# Patient Record
Sex: MALE | Race: OTHER | NOT HISPANIC OR LATINO | URBAN - METROPOLITAN AREA
[De-identification: names, ages, dates, MRNs, and addresses within clinical notes are randomized per-mention and may not be internally consistent; named-entity substitution may affect disease eponyms.]

---

## 2021-08-28 ENCOUNTER — EMERGENCY (EMERGENCY)
Facility: HOSPITAL | Age: 41
LOS: 1 days | Discharge: HOME | End: 2021-08-28
Attending: EMERGENCY MEDICINE
Payer: COMMERCIAL

## 2021-08-28 VITALS
RESPIRATION RATE: 16 BRPM | SYSTOLIC BLOOD PRESSURE: 153 MMHG | OXYGEN SATURATION: 97 % | DIASTOLIC BLOOD PRESSURE: 91 MMHG | TEMPERATURE: 99 F | WEIGHT: 212.08 LBS | HEART RATE: 78 BPM | HEIGHT: 68 IN

## 2021-08-28 VITALS
SYSTOLIC BLOOD PRESSURE: 144 MMHG | HEART RATE: 70 BPM | OXYGEN SATURATION: 100 % | RESPIRATION RATE: 17 BRPM | DIASTOLIC BLOOD PRESSURE: 87 MMHG | TEMPERATURE: 98 F

## 2021-08-28 PROCEDURE — G1004: CPT

## 2021-08-28 PROCEDURE — 99284 EMERGENCY DEPT VISIT MOD MDM: CPT

## 2021-08-28 PROCEDURE — 72192 CT PELVIS W/O DYE: CPT | Mod: 26,MG

## 2021-08-28 PROCEDURE — 73562 X-RAY EXAM OF KNEE 3: CPT

## 2021-08-28 PROCEDURE — 99284 EMERGENCY DEPT VISIT MOD MDM: CPT | Mod: 25

## 2021-08-28 PROCEDURE — 73562 X-RAY EXAM OF KNEE 3: CPT | Mod: 26,LT

## 2021-08-28 PROCEDURE — 72192 CT PELVIS W/O DYE: CPT | Mod: MG

## 2021-08-28 RX ORDER — IBUPROFEN 200 MG
600 TABLET ORAL ONCE
Refills: 0 | Status: COMPLETED | OUTPATIENT
Start: 2021-08-28 | End: 2021-08-28

## 2021-08-28 RX ORDER — FAMOTIDINE 10 MG/ML
1 INJECTION INTRAVENOUS
Qty: 7 | Refills: 0
Start: 2021-08-28 | End: 2021-09-03

## 2021-08-28 RX ADMIN — Medication 600 MILLIGRAM(S): at 16:18

## 2021-08-28 RX ADMIN — Medication 600 MILLIGRAM(S): at 17:18

## 2021-08-28 NOTE — ED ADULT NURSE NOTE - DRUG PRE-SCREENING (DAST -1)
65
Statement Selected
I will SWITCH the dose or number of times a day I take the medications listed below when I get home from the hospital:  None

## 2021-08-28 NOTE — ED PROVIDER NOTE - MUSCULOSKELETAL, MLM
Effusion tot he right knee. No marcy tenderness. No ecchymosis, no redness. Tenderness in the bilateral SI joints.

## 2021-08-28 NOTE — ED PROVIDER NOTE - PATIENT PORTAL LINK FT
You can access the FollowMyHealth Patient Portal offered by [facility] by registering at the following website: [email or web address]. By joining [facility]’s FollowMyHealth portal, you will also be able to view your health information using other applications (apps) compatible with our system.

## 2021-08-28 NOTE — ED PROVIDER NOTE - OBJECTIVE STATEMENT
40 year old male with PMHx of polio with left-sided hemiparesis and no significant PSHx presents to the ED with complaints of left knee pain and right hip pain. Patient states that he feels as though he leans too much onto his right hip when he walks. Patient denies any recent falls or any other acute complaints.   Allergies: Sulfa drugs (rash)

## 2021-10-20 ENCOUNTER — EMERGENCY (EMERGENCY)
Facility: HOSPITAL | Age: 41
LOS: 1 days | Discharge: HOME | End: 2021-10-20
Attending: EMERGENCY MEDICINE
Payer: COMMERCIAL

## 2021-10-20 VITALS
WEIGHT: 225.09 LBS | DIASTOLIC BLOOD PRESSURE: 82 MMHG | OXYGEN SATURATION: 98 % | HEIGHT: 68 IN | RESPIRATION RATE: 16 BRPM | HEART RATE: 67 BPM | TEMPERATURE: 98 F | SYSTOLIC BLOOD PRESSURE: 141 MMHG

## 2021-10-20 PROBLEM — A80.9 ACUTE POLIOMYELITIS, UNSPECIFIED: Chronic | Status: ACTIVE | Noted: 2021-08-28

## 2021-10-20 PROCEDURE — 99284 EMERGENCY DEPT VISIT MOD MDM: CPT | Mod: 25

## 2021-10-20 PROCEDURE — 73620 X-RAY EXAM OF FOOT: CPT | Mod: 26,RT

## 2021-10-20 PROCEDURE — 73610 X-RAY EXAM OF ANKLE: CPT | Mod: 26,RT

## 2021-10-20 PROCEDURE — 73620 X-RAY EXAM OF FOOT: CPT

## 2021-10-20 PROCEDURE — 29515 APPLICATION SHORT LEG SPLINT: CPT

## 2021-10-20 PROCEDURE — 73562 X-RAY EXAM OF KNEE 3: CPT

## 2021-10-20 PROCEDURE — 73562 X-RAY EXAM OF KNEE 3: CPT | Mod: 26,RT

## 2021-10-20 PROCEDURE — 73610 X-RAY EXAM OF ANKLE: CPT

## 2021-10-20 RX ORDER — ACETAMINOPHEN 500 MG
650 TABLET ORAL ONCE
Refills: 0 | Status: COMPLETED | OUTPATIENT
Start: 2021-10-20 | End: 2021-10-20

## 2021-10-20 RX ADMIN — Medication 650 MILLIGRAM(S): at 12:25

## 2021-10-20 RX ADMIN — Medication 650 MILLIGRAM(S): at 11:54

## 2021-10-20 NOTE — ED PROVIDER NOTE - NSFOLLOWUPCLINICS_GEN_ALL_ED_FT
[facility]  Podiatry/Wound [facility]  [address] [place] [address]  Phone: [phone]  Fax: [phone]

## 2021-10-20 NOTE — ED PROVIDER NOTE - PROGRESS NOTE DETAILS
xray - distal fibula fracture. Ankle splinted with a posterior ankle and stirrup splint. Will have patient follow up with the podiatrist.

## 2021-10-20 NOTE — ED PROVIDER NOTE - NSFOLLOWUPINSTRUCTIONS_ED_ALL_ED_FT
Follow up with the podiatrist within 2 days.     You can take motrin/tylenol as needed.     Do not get splint wet.     If you experience any new or worsening symptoms or if you are concerned you can always come back to the emergency for a re-evaluation.

## 2021-10-20 NOTE — ED PROVIDER NOTE - ATTENDING CONTRIBUTION TO CARE
right lateral malleolus tenderness to palpation 2+ DP pulse. xray with fx. splinted podiatry followup, patient walking with walker for comfort, will unlikely be able to use crutches due to baseline left sided weakness.

## 2021-10-20 NOTE — ED PROVIDER NOTE - PATIENT PORTAL LINK FT
You can access the FollowMyHealth Patient Portal offered by [facility] by registering at the following website: [email or web address]. By joining [facility]’s FollowMyHealth portal, you will also be able to view your health information using other applications (apps) compatible with our system.

## 2021-10-20 NOTE — ED ADULT NURSE NOTE - OBJECTIVE STATEMENT
As per pt, c/o R leg pain, R ankle pain. and R foot pain s/p twisting his R ankle [place]. No other obvious traumas or injuries. PT is able to ambulate w/ walker without difficulty. Pt admits to taking motrin for pain.

## 2021-10-20 NOTE — ED PROVIDER NOTE - CLINICAL SUMMARY MEDICAL DECISION MAKING FREE TEXT BOX
40 year old male with right lateral ankle pain s/p rolling ankle on the stairs. Patient able to ambulate with assistance of walker. Likely ankle sprain but will obtain xray to r/o fracture. Tylenol for pain.

## 2021-10-20 NOTE — ED PROVIDER NOTE - WR ORDER STATUS 3
Chief Complaint   Patient presents with   • Arm     c/o right lower arm swelling noticed this AM. last noc no swelling noted.        HISTORY OF PRESENT ILLNESS    Connie Whitlock is a 47 year old female presenting with with a history of right elbow bursitis and mild cellulitis in early September which was treated successfully but intermittently has some pain in the right elbow.  Therefore last night she were Ace wrapped around her elbow.  She had on for about 10-12 hours.  This morning when  she took it off she noticed from below elbow down her arm  was slightly swollen.  Has no pain in the area but has noticed some slight discoloration of the forearm.  Just  wanted checked to be sure everything is okay.  Has no pain and elbow or the arm at this time.  No fever or chills.  Her strength is normal.  No numbness    Current Outpatient Medications   Medication   • sertraline (ZOLOFT) 50 MG tablet   • fexofenadine-pseudoephedrine (Allegra-D Allergy & Congestion) 180-240 MG per 24 hr tablet   • montelukast (SINGULAIR) 10 MG tablet   • fluticasone (FLONASE) 50 MCG/ACT nasal spray   • Probiotic Product (PROBIOTIC DAILY PO)   • Cholecalciferol (VITAMIN D-3 PO)   • Multiple Vitamins-Minerals (MULTIVITAMIN PO)     No current facility-administered medications for this visit.          I have personally reviewed and updated the following Epic sections: Her medications, allergies, problem list, past medical history, past surgical history, social history and family history    REVIEW OF SYSTEMS: Complete 13 point review of systems is otherwise performed and is otherwise negative.      PHYSICAL EXAM  Vital Signs:    Vitals:    10/16/20 1408   BP: 119/70   Pulse: 63   Resp: 16   Temp: 97.9 °F (36.6 °C)   TempSrc: Temporal   SpO2: 99%   Weight: 68.1 kg       GENERAL: awake, alert and oriented x 3 and in no acute distress  EXTREMITIES:  no erythema, induration, no evidence of joint effusion, Range of motion of all joints is normal,  nontender over right elbow.  No swelling of elbow.  No erythema.  nontender right forearm.  There is trace to 1+ nonpitting edema from  below Elbow  just distal to the elbow and through the fingertips  NEURO:  awake, alert, oriented X3 and sensation and strength normal throughout.  SKIN:  Has multiple small petechiae of forearm, nontender.  No erythema over elbow.    ASSESSMENT:  1. Swollen arm        PLAN:  No orders of the defined types were placed in this encounter.  don't use Ace wrap on right elbow.  Was placed on too tight for too long.  Now has dependent edema in right  arm.  Elevate arm.  Swelling and petechiae should resolve in 24-72 hours.      Connie was encouraged to follow up with her primary physician if symptoms are not improving       Return if symptoms worsen or fail to improve.   Performed Resulted

## 2021-10-20 NOTE — ED PROVIDER NOTE - OBJECTIVE STATEMENT
40 year old male with a past history of polio (with left sided effected) and HTN presents with right ankle/foot pain s/p twisting his ankle on the bottom step of the stairs. Patient reports his foot rolled outwards and now he has pain on the lateral aspect of his ankle and top of his foot. Patient reports he took motrin for pain. patient was able to ambulate with the assistance of a walker. Patient denies numbness, tingling, knee pain, hip pain.

## 2021-10-21 PROBLEM — I10 ESSENTIAL (PRIMARY) HYPERTENSION: Chronic | Status: ACTIVE | Noted: 2021-10-20

## 2021-10-25 ENCOUNTER — NON-APPOINTMENT (OUTPATIENT)
Age: 41
End: 2021-10-25

## 2021-10-25 PROBLEM — Z00.00 ENCOUNTER FOR PREVENTIVE HEALTH EXAMINATION: Status: ACTIVE | Noted: 2021-10-25

## 2021-12-01 ENCOUNTER — APPOINTMENT (OUTPATIENT)
Dept: CT IMAGING | Facility: HOSPITAL | Age: 41
End: 2021-12-01

## 2023-09-21 NOTE — ED ADULT TRIAGE NOTE - PATIENT ON (OXYGEN DELIVERY METHOD)
----- Message from Zeferino Jones DO sent at 9/21/2023  8:39 AM CDT -----  Please let patient know results of testing were normal, if there are any questions please let me know.   room air

## 2023-12-28 NOTE — ED PROVIDER NOTE - DATE/TIME 1
Pt arrives from assisted living due to sodium of 124 at urgent care, and for generalized weakness for several weeks. Denies nausea, vomiting, or diarrhea. History of hyponatremia previously, possibly with seizure but pt unsure. Had a fall on [address] and did hit her head, denies thinners or LOC. A&Ox4.          20-Oct-2021 14:05